# Patient Record
Sex: MALE | Race: WHITE | NOT HISPANIC OR LATINO | Employment: FULL TIME | URBAN - METROPOLITAN AREA
[De-identification: names, ages, dates, MRNs, and addresses within clinical notes are randomized per-mention and may not be internally consistent; named-entity substitution may affect disease eponyms.]

---

## 2023-12-20 ENCOUNTER — TELEMEDICINE (OUTPATIENT)
Dept: PRIMARY CARE | Facility: CLINIC | Age: 38
End: 2023-12-20
Payer: COMMERCIAL

## 2023-12-20 VITALS — HEIGHT: 69 IN | BODY MASS INDEX: 25.18 KG/M2 | WEIGHT: 170 LBS

## 2023-12-20 DIAGNOSIS — F90.0 ATTENTION DEFICIT HYPERACTIVITY DISORDER (ADHD), PREDOMINANTLY INATTENTIVE TYPE: Primary | ICD-10-CM

## 2023-12-20 PROCEDURE — 99212 OFFICE O/P EST SF 10 MIN: CPT | Performed by: PHYSICIAN ASSISTANT

## 2023-12-20 RX ORDER — LISDEXAMFETAMINE DIMESYLATE 40 MG/1
40 CAPSULE ORAL EVERY MORNING
Qty: 30 CAPSULE | Refills: 0 | Status: SHIPPED | OUTPATIENT
Start: 2024-01-19 | End: 2024-02-18

## 2023-12-20 RX ORDER — LISDEXAMFETAMINE DIMESYLATE 40 MG/1
40 CAPSULE ORAL
COMMUNITY

## 2023-12-20 RX ORDER — LISDEXAMFETAMINE DIMESYLATE 40 MG/1
40 CAPSULE ORAL EVERY MORNING
Qty: 30 CAPSULE | Refills: 0 | Status: SHIPPED | OUTPATIENT
Start: 2023-12-20 | End: 2024-01-19

## 2023-12-20 RX ORDER — LISDEXAMFETAMINE DIMESYLATE 40 MG/1
40 CAPSULE ORAL EVERY MORNING
Qty: 30 CAPSULE | Refills: 0 | Status: SHIPPED | OUTPATIENT
Start: 2024-02-18 | End: 2024-03-19

## 2023-12-20 ASSESSMENT — PATIENT HEALTH QUESTIONNAIRE - PHQ9
1. LITTLE INTEREST OR PLEASURE IN DOING THINGS: NOT AT ALL
SUM OF ALL RESPONSES TO PHQ9 QUESTIONS 1 AND 2: 0
2. FEELING DOWN, DEPRESSED OR HOPELESS: NOT AT ALL

## 2023-12-20 ASSESSMENT — PAIN SCALES - GENERAL: PAINLEVEL: 0-NO PAIN

## 2023-12-20 NOTE — PROGRESS NOTES
"Subjective   Patient ID: Ezra Jon is a 38 y.o. male who presents for med refills .    Inattention:          stable ADD. stable and controlled since resuming 3/18.         PT reports a h/o of childhood ADHD. stopped medication in middle school due to mood irritability, He states he went to psychiatry in college, rediagnosed and went back on medication. DR Sarkar records revieved from -[facility].         ADD not controlled - adderall did not last. we started vyvanse last visit, vyvanse working. works in engineering.    Denies palpitations, chest pain, SOB, mood changes. Denies current anxiety.          Review of Systems   All other systems reviewed and are negative.      Objective   Ht 1.753 m (5' 9\")   Wt 77.1 kg (170 lb)   BMI 25.10 kg/m²     Physical Exam  Neurological:      Mental Status: He is alert.   Psychiatric:         Mood and Affect: Mood normal.         Behavior: Behavior normal.         Thought Content: Thought content normal.         Judgment: Judgment normal.         Assessment/Plan   Diagnoses and all orders for this visit:  Attention deficit hyperactivity disorder (ADHD), predominantly inattentive type  -     lisdexamfetamine (Vyvanse) 40 mg capsule; Take 1 capsule (40 mg) by mouth once daily in the morning.  -     lisdexamfetamine (Vyvanse) 40 mg capsule; Take 1 capsule (40 mg) by mouth once daily in the morning. Do not start before January 19, 2024.  -     lisdexamfetamine (Vyvanse) 40 mg capsule; Take 1 capsule (40 mg) by mouth once daily in the morning. Do not start before February 18, 2024.         "

## 2024-03-19 ENCOUNTER — OFFICE VISIT (OUTPATIENT)
Dept: PRIMARY CARE | Facility: CLINIC | Age: 39
End: 2024-03-19
Payer: COMMERCIAL

## 2024-03-19 VITALS
HEIGHT: 69 IN | WEIGHT: 178 LBS | BODY MASS INDEX: 26.36 KG/M2 | DIASTOLIC BLOOD PRESSURE: 80 MMHG | HEART RATE: 77 BPM | OXYGEN SATURATION: 98 % | SYSTOLIC BLOOD PRESSURE: 130 MMHG

## 2024-03-19 DIAGNOSIS — F90.0 ATTENTION DEFICIT HYPERACTIVITY DISORDER (ADHD), PREDOMINANTLY INATTENTIVE TYPE: Primary | ICD-10-CM

## 2024-03-19 DIAGNOSIS — M75.82 ROTATOR CUFF TENDINITIS, LEFT: ICD-10-CM

## 2024-03-19 PROCEDURE — 1036F TOBACCO NON-USER: CPT | Performed by: PHYSICIAN ASSISTANT

## 2024-03-19 PROCEDURE — 99213 OFFICE O/P EST LOW 20 MIN: CPT | Performed by: PHYSICIAN ASSISTANT

## 2024-03-19 RX ORDER — LISDEXAMFETAMINE DIMESYLATE 40 MG/1
40 CAPSULE ORAL EVERY MORNING
Qty: 30 CAPSULE | Refills: 0 | Status: SHIPPED | OUTPATIENT
Start: 2024-04-18 | End: 2024-05-18

## 2024-03-19 RX ORDER — LISDEXAMFETAMINE DIMESYLATE 40 MG/1
40 CAPSULE ORAL EVERY MORNING
Qty: 30 CAPSULE | Refills: 0 | Status: SHIPPED | OUTPATIENT
Start: 2024-05-18 | End: 2024-06-17

## 2024-03-19 RX ORDER — LISDEXAMFETAMINE DIMESYLATE 40 MG/1
40 CAPSULE ORAL EVERY MORNING
Qty: 30 CAPSULE | Refills: 0 | Status: SHIPPED | OUTPATIENT
Start: 2024-03-19 | End: 2024-04-18

## 2024-03-19 ASSESSMENT — PATIENT HEALTH QUESTIONNAIRE - PHQ9
2. FEELING DOWN, DEPRESSED OR HOPELESS: NOT AT ALL
SUM OF ALL RESPONSES TO PHQ9 QUESTIONS 1 AND 2: 0
1. LITTLE INTEREST OR PLEASURE IN DOING THINGS: NOT AT ALL

## 2024-03-19 ASSESSMENT — PAIN SCALES - GENERAL: PAINLEVEL: 5

## 2024-03-19 NOTE — PROGRESS NOTES
"Subjective   Patient ID: Ezra Jon is a 39 y.o. male who presents for Med Refill (Patient will also like to discuss shoulder pain//bp).    Inattention:          stable ADD. stable and controlled since resuming 3/18.         PT reports a h/o of childhood ADHD. stopped medication in middle school due to mood irritability, He states he went to psychiatry in college, rediagnosed and went back on medication. DR Sarkar records revieved from -[facility].         ADD controlled, vyvanse working. works in engineering.    Denies palpitations, chest pain, SOB, mood changes. Denies current anxiety.     Also with shoulder issue - L shoulder pain and popping - denies specific injury but played rugby in high school which may have initiated issues. Is R handed. Denies numbness, tingling, neck pain but gets occ tightness.     Med Refill         Review of Systems   All other systems reviewed and are negative.      Objective   /80 (BP Location: Left arm, Patient Position: Sitting)   Pulse 77   Ht 1.753 m (5' 9\")   Wt 80.7 kg (178 lb)   SpO2 98%   BMI 26.29 kg/m²     Physical Exam    Assessment/Plan          "

## 2024-06-18 ENCOUNTER — OFFICE VISIT (OUTPATIENT)
Dept: PRIMARY CARE | Facility: CLINIC | Age: 39
End: 2024-06-18
Payer: COMMERCIAL

## 2024-06-18 ENCOUNTER — TELEPHONE (OUTPATIENT)
Dept: PRIMARY CARE | Facility: CLINIC | Age: 39
End: 2024-06-18

## 2024-06-18 VITALS
OXYGEN SATURATION: 98 % | HEIGHT: 69 IN | DIASTOLIC BLOOD PRESSURE: 86 MMHG | HEART RATE: 63 BPM | BODY MASS INDEX: 25.92 KG/M2 | WEIGHT: 175 LBS | SYSTOLIC BLOOD PRESSURE: 118 MMHG

## 2024-06-18 DIAGNOSIS — F90.0 ATTENTION DEFICIT HYPERACTIVITY DISORDER (ADHD), PREDOMINANTLY INATTENTIVE TYPE: ICD-10-CM

## 2024-06-18 PROCEDURE — 99213 OFFICE O/P EST LOW 20 MIN: CPT | Performed by: FAMILY MEDICINE

## 2024-06-18 PROCEDURE — 1036F TOBACCO NON-USER: CPT | Performed by: FAMILY MEDICINE

## 2024-06-18 RX ORDER — LISDEXAMFETAMINE DIMESYLATE 40 MG/1
40 CAPSULE ORAL
Qty: 30 CAPSULE | Refills: 0 | Status: SHIPPED | OUTPATIENT
Start: 2024-08-22 | End: 2024-09-21

## 2024-06-18 RX ORDER — LISDEXAMFETAMINE DIMESYLATE 40 MG/1
40 CAPSULE ORAL EVERY MORNING
Qty: 30 CAPSULE | Refills: 0 | Status: SHIPPED | OUTPATIENT
Start: 2024-06-23 | End: 2024-07-23

## 2024-06-18 RX ORDER — LISDEXAMFETAMINE DIMESYLATE 40 MG/1
40 CAPSULE ORAL EVERY MORNING
Qty: 30 CAPSULE | Refills: 0 | Status: SHIPPED | OUTPATIENT
Start: 2024-07-23 | End: 2024-08-22

## 2024-06-18 ASSESSMENT — ENCOUNTER SYMPTOMS
AGITATION: 0
DECREASED CONCENTRATION: 1
SLEEP DISTURBANCE: 0
PALPITATIONS: 0

## 2024-06-18 ASSESSMENT — PATIENT HEALTH QUESTIONNAIRE - PHQ9
SUM OF ALL RESPONSES TO PHQ9 QUESTIONS 1 AND 2: 0
2. FEELING DOWN, DEPRESSED OR HOPELESS: NOT AT ALL
1. LITTLE INTEREST OR PLEASURE IN DOING THINGS: NOT AT ALL

## 2024-06-18 ASSESSMENT — PAIN SCALES - GENERAL: PAINLEVEL: 0-NO PAIN

## 2024-06-18 NOTE — PROGRESS NOTES
"[facility]: [facility]  E/M EVALUATION    Giovanny Jon \"Monico" is a 39 y.o. male who presents for Med Refill.    Subjective   Pt  here for med refill.      ADD- stable on current therapy.     Med Refill  Pertinent negatives include no chest pain.     Review of Systems   Cardiovascular:  Negative for chest pain and palpitations.   Psychiatric/Behavioral:  Positive for decreased concentration. Negative for agitation and sleep disturbance.        Objective   Vitals:    06/18/24 0944   BP: 118/86   Pulse: 63   SpO2: 98%     Physical Exam  Constitutional:       Appearance: Normal appearance.   Cardiovascular:      Rate and Rhythm: Normal rate and regular rhythm.      Heart sounds: No murmur heard.  Pulmonary:      Effort: Pulmonary effort is normal.      Breath sounds: Normal breath sounds.   Neurological:      Mental Status: He is alert.           Assessment/Plan      There is no problem list on file for this patient.      Diagnoses and all orders for this visit:  Attention deficit hyperactivity disorder (ADHD), predominantly inattentive type  -     lisdexamfetamine (Vyvanse) 40 mg capsule; Take 1 capsule (40 mg) by mouth once daily in the morning. Take before meals. Do not fill before August 22, 2024.  -     lisdexamfetamine (Vyvanse) 40 mg capsule; Take 1 capsule (40 mg) by mouth once daily in the morning. Do not fill before July 23, 2024.  -     lisdexamfetamine (Vyvanse) 40 mg capsule; Take 1 capsule (40 mg) by mouth once daily in the morning. Do not fill before June 23, 2024.  [place].  Low risk abuse/misuse    The patient was encouraged to ensure that any/all documentation is accurate and up to date, and that our office be provided a copy in the event that anything changes.         Chuy Llanos MD   "

## 2024-06-18 NOTE — TELEPHONE ENCOUNTER
I forgot to collect a urine drug screen from the patient. He is overdue.  Order was placed, he can go to any  lab

## 2024-09-10 ENCOUNTER — TELEMEDICINE (OUTPATIENT)
Dept: PRIMARY CARE | Facility: CLINIC | Age: 39
End: 2024-09-10
Payer: COMMERCIAL

## 2024-09-10 VITALS — WEIGHT: 170 LBS | HEIGHT: 69 IN | BODY MASS INDEX: 25.18 KG/M2

## 2024-09-10 DIAGNOSIS — F90.0 ATTENTION DEFICIT HYPERACTIVITY DISORDER (ADHD), PREDOMINANTLY INATTENTIVE TYPE: ICD-10-CM

## 2024-09-10 PROBLEM — F98.8 ADD (ATTENTION DEFICIT DISORDER) WITHOUT HYPERACTIVITY: Status: ACTIVE | Noted: 2024-09-10

## 2024-09-10 PROCEDURE — 99441 PR PHYS/QHP TELEPHONE EVALUATION 5-10 MIN: CPT | Performed by: FAMILY MEDICINE

## 2024-09-10 PROCEDURE — 3008F BODY MASS INDEX DOCD: CPT | Performed by: FAMILY MEDICINE

## 2024-09-10 PROCEDURE — 1036F TOBACCO NON-USER: CPT | Performed by: FAMILY MEDICINE

## 2024-09-10 RX ORDER — LISDEXAMFETAMINE DIMESYLATE 40 MG/1
40 CAPSULE ORAL EVERY MORNING
Qty: 30 CAPSULE | Refills: 0 | Status: SHIPPED | OUTPATIENT
Start: 2024-09-23 | End: 2024-10-23

## 2024-09-10 ASSESSMENT — ENCOUNTER SYMPTOMS
DECREASED CONCENTRATION: 1
NERVOUS/ANXIOUS: 0
SLEEP DISTURBANCE: 0
PALPITATIONS: 0

## 2024-09-10 ASSESSMENT — PAIN SCALES - GENERAL: PAINLEVEL: 0-NO PAIN

## 2024-09-10 NOTE — PROGRESS NOTES
"[facility]: [facility]  E/M EVALUATION    Giovanny Jon \"Monico" is a 39 y.o. male who presents for Med Refill and [phone].    Subjective   Telemed , permission granted, phone only.     ADD- generic doesn't seem to last as long but its 40 dollars less, wants to stay on therapy.  Medication helps maintain focus and keep on task during work hours.  Refills requested.     Med Refill      Review of Systems   Cardiovascular:  Negative for palpitations.   Psychiatric/Behavioral:  Positive for decreased concentration. Negative for sleep disturbance. The patient is not nervous/anxious.        Objective   There were no vitals filed for this visit.  Physical Exam  No exam    Assessment/Plan      Patient Active Problem List   Diagnosis    ADD (attention deficit disorder) without hyperactivity       Diagnoses and all orders for this visit:  Attention deficit hyperactivity disorder (ADHD), predominantly inattentive type  -     lisdexamfetamine (Vyvanse) 40 mg capsule; Take 1 capsule (40 mg) by mouth once daily in the morning. Do not fill before September 23, 2024.  Overdue for UDS. Pt will come to office to leave sample,  no additional refills with compliance in testing.     The patient was encouraged to ensure that any/all documentation is accurate and up to date, and that our office be provided a copy in the event that anything changes.         Chuy Llanos MD   "

## 2024-10-11 ENCOUNTER — LAB (OUTPATIENT)
Dept: LAB | Facility: LAB | Age: 39
End: 2024-10-11
Payer: COMMERCIAL

## 2024-10-11 DIAGNOSIS — F90.0 ATTENTION DEFICIT HYPERACTIVITY DISORDER (ADHD), PREDOMINANTLY INATTENTIVE TYPE: ICD-10-CM

## 2024-10-11 LAB
AMPHETAMINES UR QL SCN: ABNORMAL
BARBITURATES UR QL SCN: ABNORMAL
BENZODIAZ UR QL SCN: ABNORMAL
BZE UR QL SCN: ABNORMAL
CANNABINOIDS UR QL SCN: ABNORMAL
FENTANYL+NORFENTANYL UR QL SCN: ABNORMAL
METHADONE UR QL SCN: ABNORMAL
OPIATES UR QL SCN: ABNORMAL
OXYCODONE+OXYMORPHONE UR QL SCN: ABNORMAL
PCP UR QL SCN: ABNORMAL

## 2024-10-11 PROCEDURE — 80307 DRUG TEST PRSMV CHEM ANLYZR: CPT

## 2024-10-15 LAB
AMPHET UR-MCNC: 3467 NG/ML
MDA UR-MCNC: <200 NG/ML
MDEA UR-MCNC: <200 NG/ML
MDMA UR-MCNC: <200 NG/ML
METHAMPHET UR-MCNC: <200 NG/ML
PHENTERMINE UR CFM-MCNC: <200 NG/ML

## 2024-10-25 DIAGNOSIS — F90.0 ATTENTION DEFICIT HYPERACTIVITY DISORDER (ADHD), PREDOMINANTLY INATTENTIVE TYPE: ICD-10-CM

## 2024-10-25 RX ORDER — LISDEXAMFETAMINE DIMESYLATE 40 MG/1
40 CAPSULE ORAL EVERY MORNING
Qty: 30 CAPSULE | Refills: 0 | Status: SHIPPED | OUTPATIENT
Start: 2024-10-25 | End: 2024-11-24

## 2024-11-27 DIAGNOSIS — F90.0 ATTENTION DEFICIT HYPERACTIVITY DISORDER (ADHD), PREDOMINANTLY INATTENTIVE TYPE: ICD-10-CM

## 2024-11-27 RX ORDER — LISDEXAMFETAMINE DIMESYLATE 40 MG/1
40 CAPSULE ORAL EVERY MORNING
Qty: 30 CAPSULE | Refills: 0 | Status: SHIPPED | OUTPATIENT
Start: 2024-11-27 | End: 2024-12-27

## 2024-12-09 ENCOUNTER — APPOINTMENT (OUTPATIENT)
Dept: PRIMARY CARE | Facility: CLINIC | Age: 39
End: 2024-12-09
Payer: COMMERCIAL

## 2024-12-09 DIAGNOSIS — F90.0 ATTENTION DEFICIT HYPERACTIVITY DISORDER (ADHD), PREDOMINANTLY INATTENTIVE TYPE: Primary | ICD-10-CM

## 2024-12-09 DIAGNOSIS — M77.11 LATERAL EPICONDYLITIS OF RIGHT ELBOW: ICD-10-CM

## 2024-12-09 PROCEDURE — 1036F TOBACCO NON-USER: CPT | Performed by: FAMILY MEDICINE

## 2024-12-09 PROCEDURE — 99213 OFFICE O/P EST LOW 20 MIN: CPT | Performed by: FAMILY MEDICINE

## 2024-12-09 RX ORDER — LISDEXAMFETAMINE DIMESYLATE 40 MG/1
40 CAPSULE ORAL EVERY MORNING
Qty: 30 CAPSULE | Refills: 0 | Status: SHIPPED | OUTPATIENT
Start: 2025-02-23 | End: 2025-03-25

## 2024-12-09 RX ORDER — LISDEXAMFETAMINE DIMESYLATE 40 MG/1
40 CAPSULE ORAL EVERY MORNING
Qty: 30 CAPSULE | Refills: 0 | Status: SHIPPED | OUTPATIENT
Start: 2024-12-25 | End: 2025-01-24

## 2024-12-09 RX ORDER — LISDEXAMFETAMINE DIMESYLATE 40 MG/1
40 CAPSULE ORAL
Qty: 30 CAPSULE | Refills: 0 | Status: SHIPPED | OUTPATIENT
Start: 2025-01-24 | End: 2025-02-23

## 2024-12-09 ASSESSMENT — COLUMBIA-SUICIDE SEVERITY RATING SCALE - C-SSRS
2. HAVE YOU ACTUALLY HAD ANY THOUGHTS OF KILLING YOURSELF?: NO
1. IN THE PAST MONTH, HAVE YOU WISHED YOU WERE DEAD OR WISHED YOU COULD GO TO SLEEP AND NOT WAKE UP?: NO
6. HAVE YOU EVER DONE ANYTHING, STARTED TO DO ANYTHING, OR PREPARED TO DO ANYTHING TO END YOUR LIFE?: NO

## 2024-12-09 ASSESSMENT — ENCOUNTER SYMPTOMS
OCCASIONAL FEELINGS OF UNSTEADINESS: 0
ARTHRALGIAS: 1
MYALGIAS: 1
DEPRESSION: 0
WEAKNESS: 0
NUMBNESS: 0
LOSS OF SENSATION IN FEET: 0

## 2024-12-09 ASSESSMENT — PATIENT HEALTH QUESTIONNAIRE - PHQ9
SUM OF ALL RESPONSES TO PHQ9 QUESTIONS 1 AND 2: 0
1. LITTLE INTEREST OR PLEASURE IN DOING THINGS: NOT AT ALL
2. FEELING DOWN, DEPRESSED OR HOPELESS: NOT AT ALL

## 2024-12-09 NOTE — PROGRESS NOTES
"[facility]: [facility]  E/M EVALUATION    Giovanny Jon \"Monico" is a 39 y.o. male who presents for Follow-up (3 month med F/U and review ).    Subjective   Telemed, video, permission granted.      ADD-well controlled on current therapy.  UDS current. Refills requested.  Significant benefit for task completion at work.     Right elbow - started after son was hanging on his arm. X 3 weeks. No numbness or weakness.        Review of Systems   Musculoskeletal:  Positive for arthralgias and myalgias.   Neurological:  Negative for weakness and numbness.       Objective   There were no vitals filed for this visit.  Physical Exam  Constitutional:       General: He is not in acute distress.  Neurological:      Mental Status: He is alert.       He pointed to right elbow lateral epicondyle.      Assessment/Plan      Patient Active Problem List   Diagnosis    ADD (attention deficit disorder) without hyperactivity       Diagnoses and all orders for this visit:  Attention deficit hyperactivity disorder (ADHD), predominantly inattentive type  -     lisdexamfetamine (Vyvanse) 40 mg capsule; Take 1 capsule (40 mg) by mouth once daily in the morning. Do not fill before December 25, 2024.  -     lisdexamfetamine (Vyvanse) 40 mg capsule; Take 1 capsule (40 mg) by mouth once daily in the morning. Take before meals. Do not fill before January 24, 2025.  -     lisdexamfetamine (Vyvanse) 40 mg capsule; Take 1 capsule (40 mg) by mouth once daily in the morning. Do not fill before February 23, 2025.  Lateral epicondylitis of right elbow  Exercise reviewed, ice, brace.      The patient was encouraged to ensure that any/all documentation is accurate and up to date, and that our office be provided a copy in the event that anything changes.         Chuy Llanos MD   "

## 2025-02-14 ENCOUNTER — APPOINTMENT (OUTPATIENT)
Dept: PRIMARY CARE | Facility: CLINIC | Age: 40
End: 2025-02-14
Payer: COMMERCIAL

## 2025-02-14 ASSESSMENT — LIFESTYLE VARIABLES: HISTORY_OF_SMOKING: I HAVE NEVER SMOKED

## 2025-04-04 ENCOUNTER — OFFICE VISIT (OUTPATIENT)
Dept: PRIMARY CARE | Facility: CLINIC | Age: 40
End: 2025-04-04
Payer: COMMERCIAL

## 2025-04-04 VITALS
WEIGHT: 181 LBS | HEIGHT: 69 IN | HEART RATE: 69 BPM | DIASTOLIC BLOOD PRESSURE: 74 MMHG | SYSTOLIC BLOOD PRESSURE: 116 MMHG | BODY MASS INDEX: 26.81 KG/M2 | OXYGEN SATURATION: 98 %

## 2025-04-04 DIAGNOSIS — F90.0 ATTENTION DEFICIT HYPERACTIVITY DISORDER (ADHD), PREDOMINANTLY INATTENTIVE TYPE: ICD-10-CM

## 2025-04-04 DIAGNOSIS — Z13.29 SCREENING FOR THYROID DISORDER: ICD-10-CM

## 2025-04-04 DIAGNOSIS — Z13.1 SCREENING FOR DIABETES MELLITUS: ICD-10-CM

## 2025-04-04 DIAGNOSIS — Z11.59 SCREENING FOR VIRAL DISEASE: ICD-10-CM

## 2025-04-04 DIAGNOSIS — Z00.00 WELLNESS EXAMINATION: Primary | ICD-10-CM

## 2025-04-04 DIAGNOSIS — E55.9 VITAMIN D DEFICIENCY: ICD-10-CM

## 2025-04-04 DIAGNOSIS — Z13.6 ENCOUNTER FOR SCREENING FOR CARDIOVASCULAR DISORDERS: ICD-10-CM

## 2025-04-04 DIAGNOSIS — M77.11 LATERAL EPICONDYLITIS OF RIGHT ELBOW: ICD-10-CM

## 2025-04-04 DIAGNOSIS — R79.9 ABNORMAL FINDING OF BLOOD CHEMISTRY, UNSPECIFIED: ICD-10-CM

## 2025-04-04 PROCEDURE — 99395 PREV VISIT EST AGE 18-39: CPT | Performed by: FAMILY MEDICINE

## 2025-04-04 PROCEDURE — 99214 OFFICE O/P EST MOD 30 MIN: CPT | Performed by: FAMILY MEDICINE

## 2025-04-04 PROCEDURE — 3008F BODY MASS INDEX DOCD: CPT | Performed by: FAMILY MEDICINE

## 2025-04-04 RX ORDER — LISDEXAMFETAMINE DIMESYLATE 40 MG/1
40 CAPSULE ORAL EVERY MORNING
Qty: 30 CAPSULE | Refills: 0 | Status: SHIPPED | OUTPATIENT
Start: 2025-04-04 | End: 2025-05-04

## 2025-04-04 RX ORDER — LISDEXAMFETAMINE DIMESYLATE 40 MG/1
40 CAPSULE ORAL EVERY MORNING
Qty: 30 CAPSULE | Refills: 0 | Status: SHIPPED | OUTPATIENT
Start: 2025-05-04 | End: 2025-06-03

## 2025-04-04 RX ORDER — LISDEXAMFETAMINE DIMESYLATE 40 MG/1
40 CAPSULE ORAL EVERY MORNING
Qty: 30 CAPSULE | Refills: 0 | Status: SHIPPED | OUTPATIENT
Start: 2025-06-03 | End: 2025-07-03

## 2025-04-04 RX ORDER — NAPROXEN 500 MG/1
500 TABLET ORAL 2 TIMES DAILY PRN
Qty: 60 TABLET | Refills: 0 | Status: SHIPPED | OUTPATIENT
Start: 2025-04-04 | End: 2025-07-03

## 2025-04-04 ASSESSMENT — PAIN SCALES - GENERAL: PAINLEVEL_OUTOF10: 0-NO PAIN

## 2025-04-04 NOTE — PROGRESS NOTES
40-year presents to clinic to establish care for yearly physical follow-up chronic medical conditions new complaints    Health Maintenance:  Eats a varied and healthy diet.  Gets minimal exercise.  Does not drink, smoke, use illicit substances.  Otherwise up-to-date on all routine health maintenance screenings.  Up-to-date on recommended immunizations.  Due for yearly lab work.        ADHD: Currently on Vyvanse 40 mg daily.  Has been stable on his medication dose for some time.  Denies any recent worsening of symptoms.  Denies side effects of medications.    Right elbow pain:  Has been dealing with lateral epicondylitis of his right elbow for at least 6 months without improvement in symptoms      All pertinent positive symptoms are included in history of present illness.    All other systems have been reviewed and are negative and noncontributory to this patient's current ailments.      CONSTITUTIONAL - INAD. Not ill appearing.  SKIN - No lesions or rashes visualized. Good skin turgor.  HEENT- Head is atraumatic and normocephalic. Nasal turbinates are nonerythematous and without drainage. .  RESP - CTAB. No wheezing, rhonchi, or crackles.   CARDIAC - RRR. No murmurs, gallops, or rubs.  ABDOMEN - Soft, nontender, nondistended. No organomegaly.  NEURO - CNs 2-12 grossly intact.  PSYCH - Normal mood and affect  MUSCULOSKELETAL-Affected elbow range of motion is 180° extension, 150° flexion, 160° pronation, 90° supination, stable to varus and valgus stress, nontender to palpation at the biceps insertion, nontender to palpation at the triceps insertion, tender lateral epicondyle, nontender medial epicondyle.  Tender to resisted wrist extension.  Nontender to resisted wrist flexion.  Tender to resisted wrist supination.  Nontender to resisted wrist pronation.  No joint deformities or fluid collections seen.    1. Wellness examination (Primary)  Health and wellness topics discussed today.  Recommended eating a varied and  healthy diet and made dietary recommendations, also discussed exercise and exercising regularly 30 minutes a day 3 days a week.  Immunizations recommended and updated.  Health screening guidelines discussed with patient including possible things such as colonoscopies, mammograms, prostate screenings, lung cancer screenings, bone densitometry, and other wellness topics.  Yearly lab work ordered or reviewed today  - Hepatitis C antibody; Future  - HIV 1/2 Antigen/Antibody Screen with Reflex to Confirmation; Future  - CBC and Auto Differential; Future  - Comprehensive Metabolic Panel; Future  - Lipid Panel; Future  - Hemoglobin A1C; Future  - TSH with reflex to Free T4 if abnormal; Future  - Vitamin D 25-Hydroxy,Total (for eval of Vitamin D levels); Future  - Hepatitis C antibody  - HIV 1/2 Antigen/Antibody Screen with Reflex to Confirmation  - CBC and Auto Differential  - Comprehensive Metabolic Panel  - Lipid Panel  - Hemoglobin A1C  - TSH with reflex to Free T4 if abnormal  - Vitamin D 25-Hydroxy,Total (for eval of Vitamin D levels)    2. Abnormal finding of blood chemistry, unspecified    - Hepatitis C antibody; Future  - HIV 1/2 Antigen/Antibody Screen with Reflex to Confirmation; Future  - CBC and Auto Differential; Future  - Comprehensive Metabolic Panel; Future  - Lipid Panel; Future  - Hemoglobin A1C; Future  - TSH with reflex to Free T4 if abnormal; Future  - Vitamin D 25-Hydroxy,Total (for eval of Vitamin D levels); Future  - Hepatitis C antibody  - HIV 1/2 Antigen/Antibody Screen with Reflex to Confirmation  - CBC and Auto Differential  - Comprehensive Metabolic Panel  - Lipid Panel  - Hemoglobin A1C  - TSH with reflex to Free T4 if abnormal  - Vitamin D 25-Hydroxy,Total (for eval of Vitamin D levels)    3. Screening for thyroid disorder    - Hepatitis C antibody; Future  - HIV 1/2 Antigen/Antibody Screen with Reflex to Confirmation; Future  - CBC and Auto Differential; Future  - Comprehensive Metabolic  Panel; Future  - Lipid Panel; Future  - Hemoglobin A1C; Future  - TSH with reflex to Free T4 if abnormal; Future  - Vitamin D 25-Hydroxy,Total (for eval of Vitamin D levels); Future  - Hepatitis C antibody  - HIV 1/2 Antigen/Antibody Screen with Reflex to Confirmation  - CBC and Auto Differential  - Comprehensive Metabolic Panel  - Lipid Panel  - Hemoglobin A1C  - TSH with reflex to Free T4 if abnormal  - Vitamin D 25-Hydroxy,Total (for eval of Vitamin D levels)    4. Screening for diabetes mellitus    - Hepatitis C antibody; Future  - HIV 1/2 Antigen/Antibody Screen with Reflex to Confirmation; Future  - CBC and Auto Differential; Future  - Comprehensive Metabolic Panel; Future  - Lipid Panel; Future  - Hemoglobin A1C; Future  - TSH with reflex to Free T4 if abnormal; Future  - Vitamin D 25-Hydroxy,Total (for eval of Vitamin D levels); Future  - Hepatitis C antibody  - HIV 1/2 Antigen/Antibody Screen with Reflex to Confirmation  - CBC and Auto Differential  - Comprehensive Metabolic Panel  - Lipid Panel  - Hemoglobin A1C  - TSH with reflex to Free T4 if abnormal  - Vitamin D 25-Hydroxy,Total (for eval of Vitamin D levels)    5. Screening for viral disease    - Hepatitis C antibody; Future  - HIV 1/2 Antigen/Antibody Screen with Reflex to Confirmation; Future  - CBC and Auto Differential; Future  - Comprehensive Metabolic Panel; Future  - Lipid Panel; Future  - Hemoglobin A1C; Future  - TSH with reflex to Free T4 if abnormal; Future  - Vitamin D 25-Hydroxy,Total (for eval of Vitamin D levels); Future  - Hepatitis C antibody  - HIV 1/2 Antigen/Antibody Screen with Reflex to Confirmation  - CBC and Auto Differential  - Comprehensive Metabolic Panel  - Lipid Panel  - Hemoglobin A1C  - TSH with reflex to Free T4 if abnormal  - Vitamin D 25-Hydroxy,Total (for eval of Vitamin D levels)    6. Encounter for screening for cardiovascular disorders    - Hepatitis C antibody; Future  - HIV 1/2 Antigen/Antibody Screen with  Reflex to Confirmation; Future  - CBC and Auto Differential; Future  - Comprehensive Metabolic Panel; Future  - Lipid Panel; Future  - Hemoglobin A1C; Future  - TSH with reflex to Free T4 if abnormal; Future  - Vitamin D 25-Hydroxy,Total (for eval of Vitamin D levels); Future  - Hepatitis C antibody  - HIV 1/2 Antigen/Antibody Screen with Reflex to Confirmation  - CBC and Auto Differential  - Comprehensive Metabolic Panel  - Lipid Panel  - Hemoglobin A1C  - TSH with reflex to Free T4 if abnormal  - Vitamin D 25-Hydroxy,Total (for eval of Vitamin D levels)    7. Attention deficit hyperactivity disorder (ADHD), predominantly inattentive type  Had a discussion about the natural history and course of ADHD.  We spoke about the differential diagnosis of inattention including alternative diagnoses such as depression, estela, and anxiety disorders.  Talked about how a combination of both behavioral modification, counseling, and medication can be helpful.    Spoke about the need for a formal psychological evaluation for objective clinical evidence of ADHD before beginning treatment.  These objective exams could include the [place], DIVA 2.0, or others.    Spoke about the use of different medications including nonstimulant medications and stimulant medications.  Nonstimulant medications discussed could have included guanfacine, Strattera, Wellbutrin, or others.  Stimulant medications include amphetamines, methylphenidates and their derivatives.  The cost and benefit of each of these types of medications including possible side effects the patient could expect was discussed if recommended.     Talked with the patient about controlled substance agreements and the need for follow-up every 3 months if a controlled substance is used.     Talked about the need for psychiatric referral if I feel it is necessary.    Patient made an informed decision on one of these interventions at this time.  - lisdexamfetamine (Vyvanse) 40 mg  capsule; Take 1 capsule (40 mg) by mouth once daily in the morning.  Dispense: 30 capsule; Refill: 0  - lisdexamfetamine (Vyvanse) 40 mg capsule; Take 1 capsule (40 mg) by mouth once daily in the morning. Do not fill before May 4, 2025.  Dispense: 30 capsule; Refill: 0  - lisdexamfetamine (Vyvanse) 40 mg capsule; Take 1 capsule (40 mg) by mouth once daily in the morning. Do not fill before Joselyn 3, 2025.  Dispense: 30 capsule; Refill: 0  - Hepatitis C antibody; Future  - HIV 1/2 Antigen/Antibody Screen with Reflex to Confirmation; Future  - CBC and Auto Differential; Future  - Comprehensive Metabolic Panel; Future  - Lipid Panel; Future  - Hemoglobin A1C; Future  - TSH with reflex to Free T4 if abnormal; Future  - Vitamin D 25-Hydroxy,Total (for eval of Vitamin D levels); Future  - Drug Screen, Urine With Reflex to Confirmation; Future  - Hepatitis C antibody  - HIV 1/2 Antigen/Antibody Screen with Reflex to Confirmation  - CBC and Auto Differential  - Comprehensive Metabolic Panel  - Lipid Panel  - Hemoglobin A1C  - TSH with reflex to Free T4 if abnormal  - Vitamin D 25-Hydroxy,Total (for eval of Vitamin D levels)  - Drug Screen, Urine With Reflex to Confirmation    8. Vitamin D deficiency    - Hepatitis C antibody; Future  - HIV 1/2 Antigen/Antibody Screen with Reflex to Confirmation; Future  - CBC and Auto Differential; Future  - Comprehensive Metabolic Panel; Future  - Lipid Panel; Future  - Hemoglobin A1C; Future  - TSH with reflex to Free T4 if abnormal; Future  - Vitamin D 25-Hydroxy,Total (for eval of Vitamin D levels); Future  - Hepatitis C antibody  - HIV 1/2 Antigen/Antibody Screen with Reflex to Confirmation  - CBC and Auto Differential  - Comprehensive Metabolic Panel  - Lipid Panel  - Hemoglobin A1C  - TSH with reflex to Free T4 if abnormal  - Vitamin D 25-Hydroxy,Total (for eval of Vitamin D levels)    9. Lateral epicondylitis of right elbow  We had a long discussion in regards to your elbow pain.  We  discussed the differential diagnosis of lateral epicondylitis, medial epicondylitis, arthritis, radiculopathy, radial tunnel syndrome, and loose bodies/bony pathology as all being potential sources of the pain.  We discussed the non-operative and operative treatment options for each of these conditions with you.     We will start off by treating your elbow conservatively (AKA non-operatively).  We gave you a prescription for physical therapy to work on increasing the range of motion and strength in your elbow.     Elbow injection was also discussed and/or provided if desired today.    We will see you back in 4-6 weeks to reevaluate your elbow pain. If you are still having pain at that time, we would then need to order an MRI to look for possible tendon tears or other pathology, as well as provide a referral to orthopedic surgery. We will see you back in 4-6 weeks, or sooner if necessary. Please call with any questions or problems.  - naproxen (Naprosyn) 500 mg tablet; Take 1 tablet (500 mg) by mouth 2 times a day as needed for mild pain (1 - 3) (pain).  Dispense: 60 tablet; Refill: 0  - Referral to Physical Therapy; Future

## 2025-05-08 DIAGNOSIS — M77.11 LATERAL EPICONDYLITIS OF RIGHT ELBOW: ICD-10-CM

## 2025-05-09 RX ORDER — NAPROXEN 500 MG/1
TABLET ORAL
Qty: 60 TABLET | Refills: 0 | Status: SHIPPED | OUTPATIENT
Start: 2025-05-09

## 2025-07-01 ENCOUNTER — TELEMEDICINE (OUTPATIENT)
Dept: PRIMARY CARE | Facility: CLINIC | Age: 40
End: 2025-07-01
Payer: COMMERCIAL

## 2025-07-01 DIAGNOSIS — F90.0 ATTENTION DEFICIT HYPERACTIVITY DISORDER (ADHD), PREDOMINANTLY INATTENTIVE TYPE: Primary | ICD-10-CM

## 2025-07-01 DIAGNOSIS — M77.11 LATERAL EPICONDYLITIS OF RIGHT ELBOW: ICD-10-CM

## 2025-07-01 PROCEDURE — 99213 OFFICE O/P EST LOW 20 MIN: CPT | Performed by: FAMILY MEDICINE

## 2025-07-01 RX ORDER — LISDEXAMFETAMINE DIMESYLATE 50 MG/1
50 CAPSULE ORAL EVERY MORNING
Qty: 30 CAPSULE | Refills: 0 | Status: SHIPPED | OUTPATIENT
Start: 2025-07-31 | End: 2025-08-30

## 2025-07-01 RX ORDER — LISDEXAMFETAMINE DIMESYLATE 50 MG/1
50 CAPSULE ORAL EVERY MORNING
Qty: 30 CAPSULE | Refills: 0 | Status: SHIPPED | OUTPATIENT
Start: 2025-08-30 | End: 2025-09-29

## 2025-07-01 RX ORDER — LISDEXAMFETAMINE DIMESYLATE 50 MG/1
50 CAPSULE ORAL EVERY MORNING
Qty: 30 CAPSULE | Refills: 0 | Status: SHIPPED | OUTPATIENT
Start: 2025-07-01 | End: 2025-07-31

## 2025-07-01 NOTE — PROGRESS NOTES
Virtual or Telephone Consent    An interactive audio and video telecommunication system which permits real time communications between the patient (at the originating site) and provider (at the distant site) was utilized to provide this telehealth service.   Verbal consent was requested and obtained from Giovanny Jon on this date, 07/01/25 for a telehealth visit and the patient's location was confirmed at the time of the visit.    40-year presents via virtual visit for follow-up    Elbow pain:  Patient is doing home directed physical therapy is noticing some reduction in symptoms including massaging his tendon and taking naproxen as needed.  Still having symptoms but is less severe than previous      ADHD:  Has gained some weight recently over the last few months and is noticing a decreased effect on the 40 mg dosage of Vyvanse.  Noticing worsening symptomatology including worsening attention and focus.      All pertinent positive symptoms are included in history of present illness.    All other systems have been reviewed and are negative and noncontributory to this patient's current ailments.    CONSTITUTIONAL - INAD. Not ill appearing.  SKIN - No lesions or rashes visualized. No jaundice.   HEENT- head appears atraumatic and normocephalic. There is no scleral icterus visualized. EOMI. External nares are not erythematous without drainage. Limited inspection of the throat shows is nonerythematous and without exudate.  RESP - No labored breathing.        1. Attention deficit hyperactivity disorder (ADHD), predominantly inattentive type (Primary)  Increase Vyvanse to 50 mg daily.  Due for lab work please get lab work done before next appointment.  OARRS report checked today and is consistent.  No signs of misuse abuse or diversion.  Follow-up in 3 months.  - lisdexamfetamine (Vyvanse) 50 mg capsule; Take 1 capsule (50 mg) by mouth once daily in the morning.  Dispense: 30 capsule; Refill: 0  - lisdexamfetamine  (Vyvanse) 50 mg capsule; Take 1 capsule (50 mg) by mouth once daily in the morning. Do not fill before July 31, 2025.  Dispense: 30 capsule; Refill: 0  - lisdexamfetamine (Vyvanse) 50 mg capsule; Take 1 capsule (50 mg) by mouth once daily in the morning. Do not fill before August 30, 2025.  Dispense: 30 capsule; Refill: 0    2. Lateral epicondylitis of right elbow  Continue home directed physical therapy